# Patient Record
Sex: MALE | Race: BLACK OR AFRICAN AMERICAN
[De-identification: names, ages, dates, MRNs, and addresses within clinical notes are randomized per-mention and may not be internally consistent; named-entity substitution may affect disease eponyms.]

---

## 2018-06-15 ENCOUNTER — HOSPITAL ENCOUNTER (EMERGENCY)
Dept: HOSPITAL 17 - NEPE | Age: 58
Discharge: HOME | End: 2018-06-15
Payer: OTHER GOVERNMENT

## 2018-06-15 VITALS
OXYGEN SATURATION: 95 % | HEART RATE: 69 BPM | DIASTOLIC BLOOD PRESSURE: 67 MMHG | RESPIRATION RATE: 16 BRPM | SYSTOLIC BLOOD PRESSURE: 116 MMHG

## 2018-06-15 VITALS
TEMPERATURE: 98.5 F | OXYGEN SATURATION: 92 % | HEART RATE: 76 BPM | RESPIRATION RATE: 18 BRPM | SYSTOLIC BLOOD PRESSURE: 118 MMHG | DIASTOLIC BLOOD PRESSURE: 70 MMHG

## 2018-06-15 VITALS — WEIGHT: 208.34 LBS | HEIGHT: 68 IN | BODY MASS INDEX: 31.57 KG/M2

## 2018-06-15 VITALS
TEMPERATURE: 98.5 F | HEART RATE: 75 BPM | OXYGEN SATURATION: 98 % | RESPIRATION RATE: 18 BRPM | SYSTOLIC BLOOD PRESSURE: 118 MMHG | DIASTOLIC BLOOD PRESSURE: 70 MMHG

## 2018-06-15 DIAGNOSIS — Z85.819: ICD-10-CM

## 2018-06-15 DIAGNOSIS — Z79.82: ICD-10-CM

## 2018-06-15 DIAGNOSIS — R55: Primary | ICD-10-CM

## 2018-06-15 DIAGNOSIS — Z79.899: ICD-10-CM

## 2018-06-15 DIAGNOSIS — N40.0: ICD-10-CM

## 2018-06-15 LAB
ALBUMIN SERPL-MCNC: 3.6 GM/DL (ref 3.4–5)
ALP SERPL-CCNC: 67 U/L (ref 45–117)
ALT SERPL-CCNC: 31 U/L (ref 12–78)
AST SERPL-CCNC: 16 U/L (ref 15–37)
BASOPHILS # BLD AUTO: 0.1 TH/MM3 (ref 0–0.2)
BASOPHILS NFR BLD: 1.2 % (ref 0–2)
BILIRUB SERPL-MCNC: 0.5 MG/DL (ref 0.2–1)
BUN SERPL-MCNC: 13 MG/DL (ref 7–18)
CALCIUM SERPL-MCNC: 8.7 MG/DL (ref 8.5–10.1)
CHLORIDE SERPL-SCNC: 103 MEQ/L (ref 98–107)
COLOR UR: YELLOW
CREAT SERPL-MCNC: 1.13 MG/DL (ref 0.6–1.3)
D-DIMER: 0.22 MG/L FEU (ref 0–0.5)
EOSINOPHIL # BLD: 0.3 TH/MM3 (ref 0–0.4)
EOSINOPHIL NFR BLD: 4.7 % (ref 0–4)
ERYTHROCYTE [DISTWIDTH] IN BLOOD BY AUTOMATED COUNT: 15.2 % (ref 11.6–17.2)
GFR SERPLBLD BASED ON 1.73 SQ M-ARVRAT: 80 ML/MIN (ref 89–?)
GLUCOSE SERPL-MCNC: 54 MG/DL (ref 74–106)
GLUCOSE UR STRIP-MCNC: (no result) MG/DL
HCO3 BLD-SCNC: 29.1 MEQ/L (ref 21–32)
HCT VFR BLD CALC: 46 % (ref 39–51)
HGB BLD-MCNC: 14.8 GM/DL (ref 13–17)
HGB UR QL STRIP: (no result)
HYALINE CASTS #/AREA URNS LPF: 12 /LPF
INR PPP: 1.1 RATIO
KETONES UR STRIP-MCNC: (no result) MG/DL
LYMPHOCYTES # BLD AUTO: 1.4 TH/MM3 (ref 1–4.8)
LYMPHOCYTES NFR BLD AUTO: 24.1 % (ref 9–44)
MCH RBC QN AUTO: 25 PG (ref 27–34)
MCHC RBC AUTO-ENTMCNC: 32.3 % (ref 32–36)
MCV RBC AUTO: 77.5 FL (ref 80–100)
MONOCYTE #: 0.3 TH/MM3 (ref 0–0.9)
MONOCYTES NFR BLD: 4.6 % (ref 0–8)
MUCOUS THREADS #/AREA URNS LPF: (no result) /LPF
NEUTROPHILS # BLD AUTO: 3.8 TH/MM3 (ref 1.8–7.7)
NEUTROPHILS NFR BLD AUTO: 65.4 % (ref 16–70)
NITRITE UR QL STRIP: (no result)
PLATELET # BLD: 180 TH/MM3 (ref 150–450)
PMV BLD AUTO: 8.6 FL (ref 7–11)
PROT SERPL-MCNC: 7.2 GM/DL (ref 6.4–8.2)
PROTHROMBIN TIME: 11.1 SEC (ref 9.8–11.6)
RBC # BLD AUTO: 5.94 MIL/MM3 (ref 4.5–5.9)
SODIUM SERPL-SCNC: 140 MEQ/L (ref 136–145)
SP GR UR STRIP: 1.02 (ref 1–1.03)
TROPONIN I SERPL-MCNC: (no result) NG/ML (ref 0.02–0.05)
URINE LEUKOCYTE ESTERASE: (no result)
WBC # BLD AUTO: 5.8 TH/MM3 (ref 4–11)

## 2018-06-15 PROCEDURE — 82550 ASSAY OF CK (CPK): CPT

## 2018-06-15 PROCEDURE — 85379 FIBRIN DEGRADATION QUANT: CPT

## 2018-06-15 PROCEDURE — 93005 ELECTROCARDIOGRAM TRACING: CPT

## 2018-06-15 PROCEDURE — 82552 ASSAY OF CPK IN BLOOD: CPT

## 2018-06-15 PROCEDURE — 99285 EMERGENCY DEPT VISIT HI MDM: CPT

## 2018-06-15 PROCEDURE — 83880 ASSAY OF NATRIURETIC PEPTIDE: CPT

## 2018-06-15 PROCEDURE — 80053 COMPREHEN METABOLIC PANEL: CPT

## 2018-06-15 PROCEDURE — 84484 ASSAY OF TROPONIN QUANT: CPT

## 2018-06-15 PROCEDURE — 85730 THROMBOPLASTIN TIME PARTIAL: CPT

## 2018-06-15 PROCEDURE — 85025 COMPLETE CBC W/AUTO DIFF WBC: CPT

## 2018-06-15 PROCEDURE — 71045 X-RAY EXAM CHEST 1 VIEW: CPT

## 2018-06-15 PROCEDURE — 85610 PROTHROMBIN TIME: CPT

## 2018-06-15 PROCEDURE — 81001 URINALYSIS AUTO W/SCOPE: CPT

## 2018-06-15 PROCEDURE — 70450 CT HEAD/BRAIN W/O DYE: CPT

## 2018-06-15 NOTE — RADRPT
EXAM DATE:  6/15/2018 8:22 PM EDT

AGE/SEX:        58 years / Male



INDICATIONS:  Palpitations.



CLINICAL DATA:  This is the patient's initial encounter. Patient reports that signs and symptoms have
 been present for 1 day and indicates a pain score of 0/10. 

                                                                          

MEDICAL/SURGICAL HISTORY:       None. None.



COMPARISON:      No prior exams available for comparison. 





FINDINGS:  

No focal airspace disease or effusion. Heart size normal. Mildly tortuous aorta.



CONCLUSION: 

No acute findings.



Electronically signed by: Jaylan Pemberton MD  6/15/2018 8:32 PM EDT

## 2018-06-15 NOTE — PD
HPI


Chief Complaint:  Syncope/Near-Syncope


Time Seen by Provider:  19:07


Travel History


International Travel<30 days:  No


Contact w/Intl Traveler<30days:  No


Traveled to known affect area:  No





History of Present Illness


HPI


Patient 58-year-old male who 2 hours prior to presentation was sitting in a 

local restaurant eating a taco salad when suddenly he felt very hot and flushed 

and felt like he was going to pass out, he conveyed this to his wife his wife 

states that he slumped forward in his chair eyes wide open and was unresponsive 

for a few seconds.  He states he does not recall but apparently await staff 

member walked into an area where he could lie down and apparently he threw up 

once or twice and then ended up on the ground and that is when he came to with 

paramedics standing over him.  Is unclear as how long he was out the second 

time.  No shortness of breath no chest pain no focalized weakness.  This is 

never happened to him before, he has a history of throat cancer in remission 

states he has not seen his oncologist in several years





PFSH


Past Medical History


Hx Anticoagulant Therapy:  Yes (81MG ASA )


Cancer:  Yes (THROAT, REMISSION SINCE JUNE 2010)


Medical other:  Yes (BPH)





Social History


Alcohol Use:  Yes


Tobacco Use:  No


Substance Use:  No





Allergies-Medications


(Allergen,Severity, Reaction):  


Coded Allergies:  


     No Known Allergies (Unverified , 6/15/18)


Reported Meds & Prescriptions





Reported Meds & Active Scripts


Active


Reported


Aspirin 81 Mg Chew 81 Mg CHEW DAILY


Tamsulosin (Tamsulosin HCl) 0.4 Mg Cap 0.4 Mg  HS








Review of Systems


Except as stated in HPI:  all other systems reviewed are Neg





Physical Exam


Narrative


GENERAL: Well-developed well-nourished no obvious distress.


SKIN: Focused skin assessment warm/dry.


HEAD: Atraumatic. Normocephalic. 


EYES: Pupils equal and round. No scleral icterus. No injection or drainage. 


ENT: No nasal bleeding or discharge.  Mucous membranes pink and moist.


NECK: Trachea midline. No JVD. 


CARDIOVASCULAR: Regular rate and rhythm.  No murmur appreciated.  2+ bilateral 

equal pulses in all 4 extremities.


RESPIRATORY: No accessory muscle use. Clear to auscultation. Breath sounds 

equal bilaterally. 


GASTROINTESTINAL: Abdomen soft, non-tender, nondistended. Hepatic and splenic 

margins not palpable. 


MUSCULOSKELETAL: No obvious deformities. No clubbing.  No cyanosis.  No edema. 


NEUROLOGICAL: Awake and alert.  Cranial nerves II through XII grossly intact 

and nonfocal, 5 out of 5 strength in all 4 extremities, cerebellar testing 

negative, ambulates with an even narrow-base gait.


PSYCHIATRIC: Appropriate mood and affect; insight and judgment normal.





Data


Data


Last Documented VS





Vital Signs








  Date Time  Temp Pulse Resp B/P (MAP) Pulse Ox O2 Delivery O2 Flow Rate FiO2


 


6/15/18 22:11        


 


6/15/18 19:11  69 16  95 Room Air  


 


6/15/18 18:38 98.5       








Orders





 Orders


Complete Blood Count With Diff (6/15/18 19:23)


Comprehensive Metabolic Panel (6/15/18 19:23)


B-Type Natriuretic Peptide (6/15/18 19:23)


Ckmb (Isoenzyme) Profile (6/15/18 19:23)


Troponin I (6/15/18 19:23)


Urinalysis - C+S If Indicated (6/15/18 19:23)


Chest, Single Ap (6/15/18 19:23)


Ecg Monitoring (6/15/18 19:23)


Iv Access Insert/Monitor (6/15/18 19:23)


Oximetry (6/15/18 19:23)


Sodium Chloride 0.9% Flush (Ns Flush) (6/15/18 19:30)


D-Dimer (6/15/18 19:34)


Act Partial Throm Time (Ptt) (6/15/18 19:34)


Prothrombin Time / Inr (Pt) (6/15/18 19:34)


Ct Brain W/O Iv Contrast(Rout) (6/15/18 )


Electrocardiogram (6/15/18 17:48)


CKMB (6/15/18 20:45)


CKMB% (6/15/18 20:45)


Ed Discharge Order (6/15/18 21:53)





Labs





Laboratory Tests








Test


  6/15/18


19:37 6/15/18


19:40 6/15/18


20:45


 


White Blood Count 5.8 TH/MM3   


 


Red Blood Count 5.94 MIL/MM3   


 


Hemoglobin 14.8 GM/DL   


 


Hematocrit 46.0 %   


 


Mean Corpuscular Volume 77.5 FL   


 


Mean Corpuscular Hemoglobin 25.0 PG   


 


Mean Corpuscular Hemoglobin


Concent 32.3 % 


  


  


 


 


Red Cell Distribution Width 15.2 %   


 


Platelet Count 180 TH/MM3   


 


Mean Platelet Volume 8.6 FL   


 


Neutrophils (%) (Auto) 65.4 %   


 


Lymphocytes (%) (Auto) 24.1 %   


 


Monocytes (%) (Auto) 4.6 %   


 


Eosinophils (%) (Auto) 4.7 %   


 


Basophils (%) (Auto) 1.2 %   


 


Neutrophils # (Auto) 3.8 TH/MM3   


 


Lymphocytes # (Auto) 1.4 TH/MM3   


 


Monocytes # (Auto) 0.3 TH/MM3   


 


Eosinophils # (Auto) 0.3 TH/MM3   


 


Basophils # (Auto) 0.1 TH/MM3   


 


CBC Comment DIFF FINAL   


 


Differential Comment    


 


B-Type Natriuretic Peptide


  LESS THAN 2


PG/ML 


  


 


 


Prothrombin Time  11.1 SEC  


 


Prothromb Time International


Ratio 


  1.1 RATIO 


  


 


 


Activated Partial


Thromboplast Time 


  23.3 SEC 


  


 


 


D-Dimer Quantitative (PE/DVT)  0.22 MG/L FEU  


 


Urine Color  YELLOW  


 


Urine Turbidity  HAZY  


 


Urine pH  5.0  


 


Urine Specific Gravity  1.024  


 


Urine Protein  30 mg/dL  


 


Urine Glucose (UA)  NEG mg/dL  


 


Urine Ketones  NEG mg/dL  


 


Urine Occult Blood  NEG  


 


Urine Nitrite  NEG  


 


Urine Bilirubin  NEG  


 


Urine Urobilinogen


  


  LESS THAN 2


mg/dL 


 


 


Urine Leukocyte Esterase  NEG  


 


Urine Hyaline Casts  12 /lpf  


 


Urine Mucus  FEW /lpf  


 


Microscopic Urinalysis Comment


  


  CULT NOT


INDICATED 


 


 


Blood Urea Nitrogen   13 MG/DL 


 


Creatinine   1.13 MG/DL 


 


Random Glucose   54 MG/DL 


 


Total Protein   7.2 GM/DL 


 


Albumin   3.6 GM/DL 


 


Calcium Level   8.7 MG/DL 


 


Alkaline Phosphatase   67 U/L 


 


Aspartate Amino Transf


(AST/SGOT) 


  


  16 U/L 


 


 


Alanine Aminotransferase


(ALT/SGPT) 


  


  31 U/L 


 


 


Total Bilirubin   0.5 MG/DL 


 


Sodium Level   140 MEQ/L 


 


Potassium Level   4.2 MEQ/L 


 


Chloride Level   103 MEQ/L 


 


Carbon Dioxide Level   29.1 MEQ/L 


 


Anion Gap   8 MEQ/L 


 


Estimat Glomerular Filtration


Rate 


  


  80 ML/MIN 


 


 


Total Creatine Kinase   178 U/L 


 


Creatine Kinase MB   0.7 NG/ML 


 


Troponin I


  


  


  LESS THAN 0.02


NG/ML











MDM


Medical Decision Making


Medical Screen Exam Complete:  Yes


Emergency Medical Condition:  Yes


Differential Diagnosis


Syncope, cardiac syncope, vasogenic syncope, dehydration, rhabdomyolysis.


Narrative Course


Patient room to the emergency department, had a syncopal episode actually 3-4 

hours prior to presentation, he appears quite well and in no distress now, he 

does state that he was working outdoors mowing his grass earlier today and had 

a couple of years as well and then had been eating outside.  He thinks all 

these factors contributed to mild dehydration.  I certainly think that his 

theory is plausible.  He has not had any chest pain shortness of breath and 

would be low risk in the symphysis, syncope scale.  Discussed with him 

observation status versus follow-up outpatient after his labs EKG and chest x-

ray and CT of the head are negative.  A d-dimer was also sent also negative.  

He would like to follow-up in [place] and had home tonight with his wife 

driving.  I think this is reasonable.  Discussed return to ED criteria follow-

up with his primary care physician.





Diagnosis





 Primary Impression:  


 Syncope


Patient Instructions:  General Instructions, Syncope (DC)





***Additional Instructions:  


Follow up with your regular physician this week.


Disposition:  01 DISCHARGE HOME


Condition:  Stable











Brian Hester MD [place] 15, 2018 19:35

## 2018-06-15 NOTE — RADRPT
EXAM DATE:  6/15/2018 9:15 PM EDT

AGE/SEX:        58 years / Male



INDICATIONS:  Patient states he had a syncopal episode, but feels fine now.



CLINICAL DATA:  This is the patient's initial encounter. Patient reports that signs and symptoms have
 been present for 1 day and indicates a pain score of 0/10. 

                                                                          

MEDICAL/SURGICAL HISTORY:   . Throat cancer  None.



RADIATION DOSE:  45.13 CTDI (mGy)







COMPARISON:      No prior exams available for comparison. 







TECHNIQUE:  CT of the head without contrast.  Using automated exposure control and adjustment of the 
mA and/or kV according to patient size, radiation dose was kept as low as reasonably achievable to ob
tain optimal diagnostic quality images.



FINDINGS: 

Cerebrum:  The ventricles are normal for age.  No evidence of midline shift, mass lesion, hemorrhage 
or acute infarction.  No extraaxial fluid collections are seen.

Posterior Fossa:  The cerebellum and brainstem are intact.  The 4th ventricle is midline.  The cerebe
llopontine angle is unremarkable.

Extracranial:  The visualized portion of the orbits is intact.

Skull:  The calvaria is intact.  No evidence of skull fracture.



CONCLUSION:

1.  No acute intracranial abnormalities.



Electronically signed by: Jaylan Pemebrton MD  6/15/2018 9:31 PM EDT

## 2018-06-16 NOTE — EKG
Date Performed: 06/15/2018       Time Performed: 17:48:42

 

PTAGE:      58 years

 

EKG:      Sinus rhythm 

 

 NORMAL ECG 

 

NO PREVIOUS TRACING            

 

DOCTOR:   Nestor Boyd  Interpretating Date/Time  06/16/2018 12:36:18